# Patient Record
Sex: FEMALE | Race: WHITE | URBAN - METROPOLITAN AREA
[De-identification: names, ages, dates, MRNs, and addresses within clinical notes are randomized per-mention and may not be internally consistent; named-entity substitution may affect disease eponyms.]

---

## 2022-12-02 ENCOUNTER — OFFICE VISIT (OUTPATIENT)
Dept: URBAN - METROPOLITAN AREA CLINIC 30 | Facility: CLINIC | Age: 57
End: 2022-12-02
Payer: OTHER MISCELLANEOUS

## 2022-12-02 DIAGNOSIS — H52.4 PRESBYOPIA: ICD-10-CM

## 2022-12-02 DIAGNOSIS — H43.393 OTHER VITREOUS OPACITIES, BILATERAL: Primary | ICD-10-CM

## 2022-12-02 PROCEDURE — 92004 COMPRE OPH EXAM NEW PT 1/>: CPT | Performed by: OPTOMETRIST

## 2022-12-02 ASSESSMENT — KERATOMETRY
OD: 43.19
OS: 43.47

## 2022-12-02 ASSESSMENT — VISUAL ACUITY
OD: 20/20
OS: 20/25

## 2022-12-02 ASSESSMENT — INTRAOCULAR PRESSURE
OD: 17
OS: 17

## 2022-12-02 NOTE — IMPRESSION/PLAN
Impression: Presbyopia: H52.4. Plan: Issued spec RX. Pt was under the impression she would be fit for CLW today, per call center. Discussed lens exchange sx.